# Patient Record
Sex: MALE | Race: OTHER
[De-identification: names, ages, dates, MRNs, and addresses within clinical notes are randomized per-mention and may not be internally consistent; named-entity substitution may affect disease eponyms.]

---

## 2017-09-07 NOTE — PATH
Surgical Pathology Report



Patient Name:  BRENDA ELDRIDGE

Accession #:  [id number]

Med. Rec. #:  [id number]                                                        

   /Age/Gender:  1965 (Age: 51) / M

Account:  [id number]                                                          

             Location: [facility]-ENDOSCOPY

Taken:  2017

Received:  2017

Reported:  2017

Physicians:  Nitish Matthews M.D.

  



Specimen(s) Received

A: BX DUODENUM 

B: BX ANTRUM 





Clinical History

Rule out celiac, anemia, normal colon







Final Diagnosis

A. DUODENUM, BIOPSY:  

DUODENAL MUCOSA WITH NO PATHOLOGIC FINDINGS.



Note: Features suggestive of celiac disease are not identified in this biopsy.



B. ANTRUM, BIOPSY:  

MILDE CHRONIC GASTRITIS.

IMMUNOSTAIN IS NEGATIVE FOR H. PYLORI ORGANISMS.





***Electronically Signed***

Gina Benavidez M.D.





Gross Description

A.  Received in formalin, labeled "duodenum" are 2 tan, irregular portions of

soft tissue averaging 0.3 cm. in greatest dimension. The specimens are submitted

in toto in one cassette.



B.  Received in formalin, labeled "antrum" is a tan, irregular portion of soft

tissue measuring 0.3 cm. in greatest dimension. The specimen is submitted in

toto in one cassette.

/2017



[place]audi

## 2021-01-02 ENCOUNTER — FORM ENCOUNTER (OUTPATIENT)
Age: 56
End: 2021-01-02

## 2021-01-03 ENCOUNTER — TRANSCRIPTION ENCOUNTER (OUTPATIENT)
Age: 56
End: 2021-01-03

## 2021-01-06 ENCOUNTER — APPOINTMENT (OUTPATIENT)
Dept: DISASTER EMERGENCY | Facility: HOSPITAL | Age: 56
End: 2021-01-06

## 2021-01-17 PROBLEM — Z00.00 ENCOUNTER FOR PREVENTIVE HEALTH EXAMINATION: Status: ACTIVE | Noted: 2021-01-17

## 2021-06-21 ENCOUNTER — HOSPITAL ENCOUNTER (OUTPATIENT)
Dept: HOSPITAL 74 - FASU-ENDO | Age: 56
Discharge: HOME | End: 2021-06-21
Attending: INTERNAL MEDICINE
Payer: COMMERCIAL

## 2021-06-21 VITALS — TEMPERATURE: 98.5 F

## 2021-06-21 VITALS — BODY MASS INDEX: 46.2 KG/M2

## 2021-06-21 VITALS — HEART RATE: 74 BPM | SYSTOLIC BLOOD PRESSURE: 148 MMHG | DIASTOLIC BLOOD PRESSURE: 76 MMHG

## 2021-06-21 DIAGNOSIS — K29.80: ICD-10-CM

## 2021-06-21 DIAGNOSIS — Z98.84: ICD-10-CM

## 2021-06-21 DIAGNOSIS — R12: ICD-10-CM

## 2021-06-21 DIAGNOSIS — K29.50: Primary | ICD-10-CM

## 2021-06-21 PROCEDURE — 0DB68ZX EXCISION OF STOMACH, VIA NATURAL OR ARTIFICIAL OPENING ENDOSCOPIC, DIAGNOSTIC: ICD-10-PCS | Performed by: INTERNAL MEDICINE

## 2021-06-21 PROCEDURE — 0DB98ZX EXCISION OF DUODENUM, VIA NATURAL OR ARTIFICIAL OPENING ENDOSCOPIC, DIAGNOSTIC: ICD-10-PCS | Performed by: INTERNAL MEDICINE
